# Patient Record
Sex: MALE | Race: WHITE | URBAN - METROPOLITAN AREA
[De-identification: names, ages, dates, MRNs, and addresses within clinical notes are randomized per-mention and may not be internally consistent; named-entity substitution may affect disease eponyms.]

---

## 2017-03-13 ENCOUNTER — TELEPHONE (OUTPATIENT)
Dept: FAMILY MEDICINE | Facility: CLINIC | Age: 37
End: 2017-03-13

## 2017-03-13 ENCOUNTER — OFFICE VISIT (OUTPATIENT)
Dept: FAMILY MEDICINE | Facility: CLINIC | Age: 37
End: 2017-03-13
Payer: COMMERCIAL

## 2017-03-13 VITALS
DIASTOLIC BLOOD PRESSURE: 82 MMHG | BODY MASS INDEX: 40.09 KG/M2 | OXYGEN SATURATION: 96 % | WEIGHT: 296 LBS | HEART RATE: 90 BPM | RESPIRATION RATE: 20 BRPM | HEIGHT: 72 IN | TEMPERATURE: 98.4 F | SYSTOLIC BLOOD PRESSURE: 126 MMHG

## 2017-03-13 DIAGNOSIS — E11.40 TYPE 2 DIABETES MELLITUS WITH DIABETIC NEUROPATHY, WITHOUT LONG-TERM CURRENT USE OF INSULIN (H): Primary | ICD-10-CM

## 2017-03-13 DIAGNOSIS — I26.99 OTHER ACUTE PULMONARY EMBOLISM WITHOUT ACUTE COR PULMONALE (H): ICD-10-CM

## 2017-03-13 LAB
CHOLEST SERPL-MCNC: 179 MG/DL
HBA1C MFR BLD: 11.8 % (ref 4.3–6)
HDLC SERPL-MCNC: 33 MG/DL
LDLC SERPL CALC-MCNC: ABNORMAL MG/DL
LDLC SERPL DIRECT ASSAY-MCNC: 110 MG/DL
NONHDLC SERPL-MCNC: 146 MG/DL
TRIGL SERPL-MCNC: 434 MG/DL
TSH SERPL DL<=0.005 MIU/L-ACNC: 1 MU/L (ref 0.4–4)

## 2017-03-13 PROCEDURE — 83721 ASSAY OF BLOOD LIPOPROTEIN: CPT | Mod: 59 | Performed by: FAMILY MEDICINE

## 2017-03-13 PROCEDURE — 99214 OFFICE O/P EST MOD 30 MIN: CPT | Performed by: FAMILY MEDICINE

## 2017-03-13 PROCEDURE — 99207 C FOOT EXAM  NO CHARGE: CPT | Performed by: FAMILY MEDICINE

## 2017-03-13 PROCEDURE — 82043 UR ALBUMIN QUANTITATIVE: CPT | Performed by: FAMILY MEDICINE

## 2017-03-13 PROCEDURE — 80061 LIPID PANEL: CPT | Performed by: FAMILY MEDICINE

## 2017-03-13 PROCEDURE — 83036 HEMOGLOBIN GLYCOSYLATED A1C: CPT | Performed by: FAMILY MEDICINE

## 2017-03-13 PROCEDURE — 36415 COLL VENOUS BLD VENIPUNCTURE: CPT | Performed by: FAMILY MEDICINE

## 2017-03-13 PROCEDURE — 84443 ASSAY THYROID STIM HORMONE: CPT | Performed by: FAMILY MEDICINE

## 2017-03-13 RX ORDER — GLIPIZIDE 5 MG/1
10 TABLET, FILM COATED, EXTENDED RELEASE ORAL DAILY
Qty: 180 TABLET | Refills: 1 | Status: SHIPPED | OUTPATIENT
Start: 2017-03-13

## 2017-03-13 NOTE — MR AVS SNAPSHOT
After Visit Summary   3/13/2017    Gabe Heard    MRN: [id number]           Patient Information     Date Of Birth          1980        Visit Information        Provider Department      3/13/2017 11:15 AM Piyush Izquierdo MD [facility]        Today's Diagnoses     Type 2 diabetes mellitus with diabetic neuropathy, without long-term current use of insulin (H)    -  1    Other acute pulmonary embolism without acute cor pulmonale (H)           Follow-ups after your visit        Additional Services     OPHTHALMOLOGY ADULT REFERRAL       Your provider has referred you to: N: [facility]  [phone]  [email or web address]    Please be aware that coverage of these services is subject to the terms and limitations of your health insurance plan.  Call member services at your health plan with any benefit or coverage questions.      Please bring the following with you to your appointment:    (1) Any X-Rays, CTs or MRIs which have been performed.  Contact the facility where they were done to arrange for  prior to your scheduled appointment.    (2) List of current medications  (3) This referral request   (4) Any documents/labs given to you for this referral                  Your next 10 appointments already scheduled     May 09, 2017  9:20 AM CDT   (Arrive by 9:05 AM)   CT CHEST W/O CONTRAST with UCCT2   [facility] ([facility])    [address] [place] [address] [phone]           Please bring any scans or X-rays taken at other hospitals, if similar tests were done. Also bring a list of your medicines, including vitamins, minerals and over-the-counter drugs. It is safest to leave personal items at home.  Be sure to tell your doctor:   If you have any allergies.   If there s any chance you are pregnant.   If you are breastfeeding.   If you have any special needs.  You  "do not need to do anything special to prepare.  Please wear loose clothing, such as a sweat suit or jogging clothes. Avoid snaps, zippers and other metal. We may ask you to undress and put on a hospital gown.            May 09, 2017 10:30 AM CDT   (Arrive by 10:15 AM)   Return Visit with Deepak Bridges MD   [facility] ([facility])    [address]  2nd Floor  [facility] [address] [phone]              Who to contact     If you have questions or need follow up information about today's clinic visit or your schedule please contact [facility] directly at [phone].  Normal or non-critical lab and imaging results will be communicated to you by MyChart, letter or phone within 4 business days after the clinic has received the results. If you do not hear from us within 7 days, please contact the clinic through MyChart or phone. If you have a critical or abnormal lab result, we will notify you by phone as soon as possible.  Submit refill requests through [employer] or call your pharmacy and they will forward the refill request to us. Please allow 3 business days for your refill to be completed.          Additional Information About Your Visit        [employer] Information     [employer] lets you send messages to your doctor, view your test results, renew your prescriptions, schedule appointments and more. To sign up, go to [email or web address] . Click on \"Log in\" on the left side of the screen, which will take you to the Welcome page. Then click on \"Sign up Now\" on the right side of the page.     You will be asked to enter the access code listed below, as well as some personal information. Please follow the directions to create your username and password.     Your access code is: [email or web address]  Expires: 2017 11:52 AM     Your access code will  in 90 days. If you need help or a new code, please call your [place] clinic or " [phone].        Care EveryWhere ID     This is your Care EveryWhere ID. This could be used by other organizations to access your [facility] medical records  [id number]        Your Vitals Were     Pulse Temperature Respirations Height Pulse Oximetry BMI (Body Mass Index)    90 98.4  F (36.9  C) (Oral) 20 6' (1.829 m) 96% 40.14 kg/m2       Blood Pressure from Last 3 Encounters:   03/13/17 126/82   11/08/16 120/78   09/13/16 126/84    Weight from Last 3 Encounters:   03/13/17 296 lb (134.3 kg)   11/08/16 (!) 300 lb 11.2 oz (136.4 kg)   09/13/16 292 lb (132.5 kg)              We Performed the Following     Albumin Random Urine Quantitative     FOOT EXAM     Hemoglobin A1c     Lipid Profile with reflex to direct LDL     OPHTHALMOLOGY ADULT REFERRAL     TSH with free T4 reflex          Where to get your medicines      These medications were sent to [place] [address] [place] [facility] [address]  [address] [facility] [address]     Phone:  [phone]     glipiZIDE 5 MG 24 hr tablet    metFORMIN 1000 MG tablet    rivaroxaban ANTICOAGULANT 20 MG Tabs tablet          Primary Care Provider Office Phone # Fax #    Piyush Izquierdo [phone]       [facility] [address] [facility] [address]        Thank you!     Thank you for choosing [facility]  for your care. Our goal is always to provide you with excellent care. Hearing back from our patients is one way we can continue to improve our services. Please take a few minutes to complete the written survey that you may receive in the mail after your visit with us. Thank you!             Your Updated Medication List - Protect others around you: Learn how to safely use, store and throw away your medicines at www.disposemymeds.org.          This list is accurate as of: 3/13/17 11:52 AM.  Always use your most recent med list.                   Brand Name Dispense Instructions for use    ASPIRIN NOT PRESCRIBED     INTENTIONAL    0 each    Antiplatelet medication not prescribed intentionally due to Current anticoagulant therapy (warfarin/enoxaparin)       blood glucose lancets standard    no brand specified    3 Box    Use to test blood sugar 2 times daily or as directed.       blood glucose monitoring test strip    no brand specified    3 Box    Use to test blood sugars 2 times daily or as directed       glipiZIDE 5 MG 24 hr tablet    glipiZIDE XL    180 tablet    Take 2 tablets (10 mg) by mouth daily       lisinopril 10 MG tablet    PRINIVIL/ZESTRIL    90 tablet    Take 1 tablet (10 mg) by mouth daily       metFORMIN 1000 MG tablet    GLUCOPHAGE    180 tablet    Take 1 tablet (1,000 mg) by mouth 2 times daily (with meals)       rivaroxaban ANTICOAGULANT 20 MG Tabs tablet    XARELTO    90 tablet    Take 1 tablet (20 mg) by mouth daily (with dinner)       STATIN NOT PRESCRIBED (INTENTIONAL)     0 each    Statin not prescribed intentionally due to not needed at this time.

## 2017-03-13 NOTE — LETTER
[facility]  [address] [place], [address]  [phone]      March 14, 2017    Gabe Heard  [address] [place] [address]          Dear Gabe,    Your blood sugar is very high, please make sure you call the clinic to discuss it as soon as possible.   High blood sugar can cause many medical problems that are serious. Enclosed is a copy of the results.        If you have any questions or concerns, please call myself or my nurse at [phone].          Sincerely,    Piyush Izquierdo MD                                      Results for orders placed or performed in visit on 03/13/17   Lipid Profile with reflex to direct LDL   Result Value Ref Range    Cholesterol 179 <200 mg/dL    Triglycerides 434 (H) <150 mg/dL    HDL Cholesterol 33 (L) >39 mg/dL    LDL Cholesterol Calculated  <100 mg/dL     Cannot estimate LDL when triglyceride exceeds 400 mg/dL    Non HDL Cholesterol 146 (H) <130 mg/dL   TSH with free T4 reflex   Result Value Ref Range    TSH 1.00 0.40 - 4.00 mU/L   Hemoglobin A1c   Result Value Ref Range    Hemoglobin A1C 11.8 (H) 4.3 - 6.0 %   LDL cholesterol direct   Result Value Ref Range    LDL Cholesterol Direct 110 (H) <100 mg/dL     ADRIAN

## 2017-03-13 NOTE — NURSING NOTE
Chief Complaint   Patient presents with     Diabetes     Refill Request     Initial /82 (BP Location: Right arm, Patient Position: Chair, Cuff Size: Adult Large)  Pulse 90  Temp 98.4  F (36.9  C) (Oral)  Resp 20  Ht 6' (1.829 m)  Wt 296 lb (134.3 kg)  SpO2 96%  BMI 40.14 kg/m2 Estimated body mass index is 40.14 kg/(m^2) as calculated from the following:    Height as of this encounter: 6' (1.829 m).    Weight as of this encounter: 296 lb (134.3 kg)..  BP completed using cuff size large  Giovana Costa, CMA

## 2017-03-13 NOTE — LETTER
[facility]  [address] [place], [place], [id number] [phone]        March 20, 2017      Gabe Heard  [address] [place] [address]        Dear Gabe,    We have tried contacting you multiple times but have been unsuccessful. .    Your blood sugar is very high. Have you been taking your medication daily?  If yes, we are needing to make some changes to your medications.    Please contact us as soon as possible to schedule a follow up appointment with me.        Sincerely,

## 2017-03-13 NOTE — PROGRESS NOTES
SUBJECTIVE:                                                    Gabe Heard is a 36 year old male who presents to clinic today for the following health issues:      Diabetes Follow-up      Patient is checking blood sugars: 2-3 times a week    Diabetic concerns: None     Symptoms of hypoglycemia (low blood sugar): none     Paresthesias (numbness or burning in feet) or sores: No     Date of last diabetic eye exam: unsure       Amount of exercise or physical activity: None    Problems taking medications regularly: No    Medication side effects: none  Diet: regular (no restrictions)        Problem list and histories reviewed & adjusted, as indicated.  Additional history: as documented    Patient Active Problem List   Diagnosis     Morbid obesity (H)     Hyperlipidemia with target LDL less than 100     Onychomycosis     Type 2 diabetes mellitus with diabetic neuropathy (H)     Pulmonary embolism (H)     Deep vein thrombosis of left lower extremity (H)     Factor V Leiden mutation (H)     Long term current use of anticoagulant therapy     Health Care Home     Pulmonary nodules     Past Surgical History   Procedure Laterality Date     Incision and drainage, abscess, simple         Social History   Substance Use Topics     Smoking status: Never Smoker     Smokeless tobacco: Never Used     Alcohol use No     Family History   Problem Relation Age of Onset     Other Cancer Mother      DIABETES Maternal Aunt          Current Outpatient Prescriptions   Medication Sig Dispense Refill     glipiZIDE (GLIPIZIDE XL) 5 MG 24 hr tablet Take 2 tablets (10 mg) by mouth daily 180 tablet 1     metFORMIN (GLUCOPHAGE) 1000 MG tablet Take 1 tablet (1,000 mg) by mouth 2 times daily (with meals) 180 tablet 1     ASPIRIN NOT PRESCRIBED, INTENTIONAL, Antiplatelet medication not prescribed intentionally due to Current anticoagulant therapy (warfarin/enoxaparin) 0 each 0     blood glucose monitoring (NO BRAND SPECIFIED) test strip Use to test  blood sugars 2 times daily or as directed 3 Box 3     blood glucose (NO BRAND SPECIFIED) lancets standard Use to test blood sugar 2 times daily or as directed. 3 Box 3     rivaroxaban ANTICOAGULANT (XARELTO) 20 MG TABS tablet Take 1 tablet (20 mg) by mouth daily (with dinner) 90 tablet 4     lisinopril (PRINIVIL,ZESTRIL) 10 MG tablet Take 1 tablet (10 mg) by mouth daily 90 tablet 4     STATIN NOT PRESCRIBED, INTENTIONAL, Statin not prescribed intentionally due to not needed at this time. 0 each 0     No Known Allergies    Reviewed and updated as needed this visit by clinical staff       Reviewed and updated as needed this visit by Provider         ROS:  C: NEGATIVE for fever, chills, change in weight  CV: NEGATIVE for chest pain, palpitations or peripheral edema    OBJECTIVE:                                                    /82 (BP Location: Right arm, Patient Position: Chair, Cuff Size: Adult Large)  Pulse 90  Temp 98.4  F (36.9  C) (Oral)  Resp 20  Ht 6' (1.829 m)  Wt 296 lb (134.3 kg)  SpO2 96%  BMI 40.14 kg/m2  Body mass index is 40.14 kg/(m^2).  GENERAL: healthy, alert and no distress  NECK: no adenopathy, no asymmetry, masses, or scars and thyroid normal to palpation  RESP: lungs clear to auscultation - no rales, rhonchi or wheezes  CV: regular rate and rhythm, normal S1 S2, no S3 or S4, no murmur, click or rub, no peripheral edema and peripheral pulses strong  ABDOMEN: soft, nontender, no hepatosplenomegaly, no masses and bowel sounds normal  MS: no gross musculoskeletal defects noted, no edema  Diabetic foot exam: normal DP and PT pulses, no trophic changes or ulcerative lesions, normal sensory exam and onychomycosis    Diagnostic Test Results:  No results found for this or any previous visit (from the past 24 hour(s)).     ASSESSMENT/PLAN:                                                            1. Other acute pulmonary embolism without acute cor pulmonale (H)  I suggested to the patient to check  with Hem/onc to decide aobut the length of such treatment given his risks factors (factor lieden positive), pt will think about it and let me know.    - rivaroxaban ANTICOAGULANT (XARELTO) 20 MG TABS tablet; Take 1 tablet (20 mg) by mouth daily (with dinner)  Dispense: 90 tablet; Refill: 4    2. Type 2 diabetes mellitus with diabetic neuropathy, without long-term current use of insulin (H)  Continue on same medications, talked about diet and exercise, refer to ophtho.  - FOOT EXAM  - Lipid Profile with reflex to direct LDL  - TSH with free T4 reflex  - Albumin Random Urine Quantitative  - Hemoglobin A1c  - metFORMIN (GLUCOPHAGE) 1000 MG tablet; Take 1 tablet (1,000 mg) by mouth 2 times daily (with meals)  Dispense: 180 tablet; Refill: 3  - glipiZIDE (GLIPIZIDE XL) 5 MG 24 hr tablet; Take 2 tablets (10 mg) by mouth daily  Dispense: 180 tablet; Refill: 1  - OPHTHALMOLOGY ADULT REFERRAL        Piyush Izquierdo MD  [facility]

## 2017-03-13 NOTE — TELEPHONE ENCOUNTER
Please call.  His blood sugar is very high.   Has he been taking his medicine? Every day?   If yes, then we need to change his medicine.   Piyush Izquierdo MD  [facility]  [phone]

## 2017-03-14 LAB
CREAT UR-MCNC: 107 MG/DL
MICROALBUMIN UR-MCNC: 12 MG/L
MICROALBUMIN/CREAT UR: 10.84 MG/G CR (ref 0–17)

## 2017-05-16 ENCOUNTER — TELEPHONE (OUTPATIENT)
Dept: FAMILY MEDICINE | Facility: CLINIC | Age: 37
End: 2017-05-16

## 2017-05-16 NOTE — TELEPHONE ENCOUNTER
Panel Management Review      Patient has the following on his problem list:     Diabetes    ASA: Not Required     Last A1C  Lab Results   Component Value Date    A1C 11.8 03/13/2017    A1C 7.4 09/03/2016    A1C 7.8 02/14/2016    A1C 9.3 10/31/2015    A1C 9.4 02/09/2015     A1C tested: FAILED    Last LDL:    Lab Results   Component Value Date    CHOL 179 03/13/2017     Lab Results   Component Value Date    HDL 33 03/13/2017     Lab Results   Component Value Date    LDL  03/13/2017     Cannot estimate LDL when triglyceride exceeds 400 mg/dL     03/13/2017     Lab Results   Component Value Date    TRIG 434 03/13/2017     Lab Results   Component Value Date    CHOLHDLRATIO 3.9 02/09/2015     Lab Results   Component Value Date    NHDL 146 03/13/2017       Is the patient on a Statin? NO             Is the patient on Aspirin? NO    Medications     HMG CoA Reductase Inhibitors    STATIN NOT PRESCRIBED, INTENTIONAL,          Last three blood pressure readings:  BP Readings from Last 3 Encounters:   03/13/17 126/82   11/08/16 120/78   09/13/16 126/84       Date of last diabetes office visit: 3/13/17     Tobacco History:     History   Smoking Status     Never Smoker   Smokeless Tobacco     Never Used           Composite cancer screening  Chart review shows that this patient is due/due soon for the following None  Summary:    Patient is due/failing the following:   BMP and elevated A1C    Action needed:   Patient needs office visit for elevated blood sugars.    Type of outreach:    Phone, left message for patient to call back.     Questions for provider review:    None                                                                                                                                    Giovana Costa CMA       Chart routed to Care Team .

## 2017-05-16 NOTE — LETTER
[facility]  [address] [facility] [id number] [phone]  May 25, 2017    Gabe Heard  [address] [place] [address]    Dear Gabe,    I care about your health and have reviewed your health plan. I have reviewed your medical conditions, medication list, and lab results and am making recommendations based on this review, to better manage your health.    You are in particular need of attention regarding:  -Diabetes    I am recommending that you:  {recommendations:-schedule a FOLLOWUP OFFICE APPOINTMENT with me.        Here is a list of Health Maintenance topics that are due now or due soon:  Health Maintenance Due   Topic Date Due     EYE EXAM Q1 YEAR  08/17/1980     CREATININE Q1 YEAR  05/16/2017       Please call us at [phone] (or use [employer]) to address the above recommendations.     Thank you for trusting [facility] and we appreciate the opportunity to serve you.  We look forward to supporting your healthcare needs in the future.    Healthy Regards,    Piyush Izquierdo MD

## 2017-05-19 ENCOUNTER — TELEPHONE (OUTPATIENT)
Dept: FAMILY MEDICINE | Facility: CLINIC | Age: 37
End: 2017-05-19

## 2017-05-19 DIAGNOSIS — E66.01 MORBID OBESITY (H): ICD-10-CM

## 2017-05-19 DIAGNOSIS — E11.40 TYPE 2 DIABETES MELLITUS WITH DIABETIC NEUROPATHY, WITHOUT LONG-TERM CURRENT USE OF INSULIN (H): Primary | ICD-10-CM

## 2017-05-19 NOTE — TELEPHONE ENCOUNTER
A referral is recommended for health  in regards to diabetes and obesity.  Referral written and sent to health  to contact patient.    Thanks  Padmini Carlos/ROSA  [place]---[facility]

## 2017-06-30 ENCOUNTER — TELEPHONE (OUTPATIENT)
Dept: NURSING | Facility: CLINIC | Age: 37
End: 2017-06-30

## 2017-07-10 ENCOUNTER — TELEPHONE (OUTPATIENT)
Dept: NURSING | Facility: CLINIC | Age: 37
End: 2017-07-10

## 2017-07-17 ENCOUNTER — TELEPHONE (OUTPATIENT)
Dept: NURSING | Facility: CLINIC | Age: 37
End: 2017-07-17

## 2017-08-31 ENCOUNTER — TELEPHONE (OUTPATIENT)
Dept: FAMILY MEDICINE | Facility: CLINIC | Age: 37
End: 2017-08-31

## 2017-08-31 NOTE — TELEPHONE ENCOUNTER
Panel Management Review      Patient has the following on his problem list:     Diabetes    ASA: Not Required     Last A1C  Lab Results   Component Value Date    A1C 11.8 03/13/2017    A1C 7.4 09/03/2016    A1C 7.8 02/14/2016    A1C 9.3 10/31/2015    A1C 9.4 02/09/2015     A1C tested: FAILED    Last LDL:    Lab Results   Component Value Date    CHOL 179 03/13/2017     Lab Results   Component Value Date    HDL 33 03/13/2017     Lab Results   Component Value Date    LDL  03/13/2017     Cannot estimate LDL when triglyceride exceeds 400 mg/dL     03/13/2017     Lab Results   Component Value Date    TRIG 434 03/13/2017     Lab Results   Component Value Date    CHOLHDLRATIO 3.9 02/09/2015     Lab Results   Component Value Date    NHDL 146 03/13/2017       Is the patient on a Statin? NO             Is the patient on Aspirin? NO    Medications     HMG CoA Reductase Inhibitors    STATIN NOT PRESCRIBED, INTENTIONAL,          Last three blood pressure readings:  BP Readings from Last 3 Encounters:   03/13/17 126/82   11/08/16 120/78   09/13/16 126/84       Date of last diabetes office visit: 3/13/17     Tobacco History:     History   Smoking Status     Never Smoker   Smokeless Tobacco     Never Used             Composite cancer screening  Chart review shows that this patient is due/due soon for the following None  Summary:    Patient is due/failing the following:   A1C    Action needed:   Patient needs office visit for diabetic check.    Type of outreach:    Phone, left message for patient to call back.     Questions for provider review:    None                                                                                                                                    Giovana Costa CMA       Chart routed to Care Team .

## 2017-08-31 NOTE — LETTER
[address] [address]  [phone]  September 7, 2017    Gabe   [place] [address]    Dear Gabe,    I care about your health and have reviewed your health plan. I have reviewed your medical conditions, medication list, and lab results and am making recommendations based on this review, to better manage your health.    You are in particular need of attention regarding:  -Diabetes    I am recommending that you:  {recommendations:-Diabetes    Here is a list of Health Maintenance topics that are due now or due soon:  Health Maintenance Due   Topic Date Due     EYE EXAM Q1 YEAR  08/17/1980     CREATININE Q1 YEAR  05/16/2017     INFLUENZA VACCINE (SYSTEM ASSIGNED)  09/01/2017     A1C Q6 MO  09/13/2017       Please call us at [phone] (or use [employer]) to address the above recommendations.     Thank you for trusting [facility] and we appreciate the opportunity to serve you.  We look forward to supporting your healthcare needs in the future.    Healthy Regards,    Piyush Izquierdo MD

## 2017-08-31 NOTE — LETTER
[address] [address]  [phone]  September 7, 2017    Gabe   [place] [address]    Dear Gabe,    I care about your health and have reviewed your health plan. I have reviewed your medical conditions, medication list, and lab results and am making recommendations based on this review, to better manage your health.    You are in particular need of attention regarding:  -Diabetes    I am recommending that you:  {recommendations:-Diabetes     Here is a list of Health Maintenance topics that are due now or due soon:  Health Maintenance Due   Topic Date Due     EYE EXAM Q1 YEAR  08/17/1980     CREATININE Q1 YEAR  05/16/2017     INFLUENZA VACCINE (SYSTEM ASSIGNED)  09/01/2017     A1C Q6 MO  09/13/2017       Please call us at [phone] (or use [employer]) to address the above recommendations.     Thank you for trusting [facility] and we appreciate the opportunity to serve you.  We look forward to supporting your healthcare needs in the future.    Healthy Regards,    Piyush Izquierdo MD

## 2017-10-20 ENCOUNTER — TELEPHONE (OUTPATIENT)
Dept: FAMILY MEDICINE | Facility: CLINIC | Age: 37
End: 2017-10-20

## 2017-10-20 NOTE — LETTER
[address] [address]  [phone]  October 30, 2017    Gabe TAI [address] [place] [address]    Dear Gabe,    I care about your health and have reviewed your health plan. I have reviewed your medical conditions, medication list, and lab results and am making recommendations based on this review, to better manage your health.    You are in particular need of attention regarding:  -Diabetes    I am recommending that you:  {recommendations:-schedule a FOLLOWUP OFFICE APPOINTMENT with me.       Here is a list of Health Maintenance topics that are due now or due soon:  Health Maintenance Due   Topic Date Due     EYE EXAM Q1 YEAR  08/17/1980     CREATININE Q1 YEAR  05/16/2017     INFLUENZA VACCINE (SYSTEM ASSIGNED)  09/01/2017     A1C Q6 MO  09/13/2017       Please call us at [phone] (or use [employer]) to address the above recommendations.     Thank you for trusting [facility] and we appreciate the opportunity to serve you.  We look forward to supporting your healthcare needs in the future.    Healthy Regards,    Piyush Izquierdo MD

## 2018-10-09 DIAGNOSIS — E11.40 TYPE 2 DIABETES MELLITUS WITH DIABETIC NEUROPATHY, WITHOUT LONG-TERM CURRENT USE OF INSULIN (H): ICD-10-CM

## 2018-10-09 NOTE — LETTER
[facility]  [address] [place], [address]  [phone]        October 16, 2018    Gabe   [place] [id number]            Dear Gabe,      We recently received a call from your pharmacy requesting a refill of metformin.     A review of your chart indicates that an appointment is required with your provider for a medication check. Please call the clinic at [phone] to schedule your appointment.     We have authorized a 14 day refill of your medication to allow time for you to schedule your appointment.     Taking care of your health is important to us and ongoing visits with your provider are vital to your care. We look forward to seeing you in the near future.     Sincerely,     [facility]

## 2018-10-09 NOTE — TELEPHONE ENCOUNTER
"Last Written Prescription Date:  3/13/17  Last Fill Quantity: 180 tablet,  # refills: 3   Last office visit: 3/13/2017 with prescribing provider:  Timbo   Future Office Visit:      Requested Prescriptions   Pending Prescriptions Disp Refills     metFORMIN (GLUCOPHAGE) 1000 MG tablet [Pharmacy Med Name: METFORMIN HCL 1,000 MG TABLET] 180 tablet 0     Sig: TAKE 1 TABLET (1,000 MG) BY MOUTH 2 TIMES DAILY (WITH MEALS)    Biguanide Agents Failed    10/9/2018  4:42 PM       Failed - Blood pressure less than 140/90 in past 6 months    BP Readings from Last 3 Encounters:   03/13/17 126/82   11/08/16 120/78   09/13/16 126/84                Failed - Patient has documented LDL within the past 12 mos.    Recent Labs   Lab Test  03/13/17   1153   LDL  Cannot estimate LDL when triglyceride exceeds 400 mg/dL  110*            Failed - Patient has had a Microalbumin in the past 15 mos.    Recent Labs   Lab Test  03/13/17   1154   MICROL  12   UMALCR  10.84            Failed - Patient has documented A1c within the specified period of time.    If HgbA1C is 8 or greater, it needs to be on file within the past 3 months.  If less than 8, must be on file within the past 6 months.     Recent Labs   Lab Test  03/13/17   1153   A1C  11.8*            Failed - Recent (6 mo) or future (30 days) visit within the authorizing provider's specialty    Patient had office visit in the last 6 months or has a visit in the next 30 days with authorizing provider or within the authorizing provider's specialty.  See \"Patient Info\" tab in inbasket, or \"Choose Columns\" in Meds & Orders section of the refill encounter.           Passed - Patient is age 10 or older       Passed - Patient's CR is NOT>1.4 OR Patient's EGFR is NOT<45 within past 12 mos.    Recent Labs   Lab Test  05/16/16   1524   GFRESTIMATED  >90  Non  GFR Calc     GFRESTBLACK  >90   GFR Calc         Recent Labs   Lab Test  05/16/16   1524   CR  0.90            " Passed - Patient does NOT have a diagnosis of CHF.

## 2018-10-12 NOTE — TELEPHONE ENCOUNTER
Will give 14 pills, pt needs to be seen for sure.  Piyush Izquierdo MD  [facility]  [phone]

## 2018-10-12 NOTE — TELEPHONE ENCOUNTER
Routing refill request to provider for review/approval because:  Last office visit 3/13/17  Manoj Selby RN